# Patient Record
Sex: FEMALE | Race: BLACK OR AFRICAN AMERICAN | Employment: FULL TIME | URBAN - METROPOLITAN AREA
[De-identification: names, ages, dates, MRNs, and addresses within clinical notes are randomized per-mention and may not be internally consistent; named-entity substitution may affect disease eponyms.]

---

## 2024-03-13 ENCOUNTER — OFFICE VISIT (OUTPATIENT)
Dept: INTERNAL MEDICINE CLINIC | Age: 49
End: 2024-03-13
Payer: COMMERCIAL

## 2024-03-13 VITALS
BODY MASS INDEX: 29.25 KG/M2 | DIASTOLIC BLOOD PRESSURE: 68 MMHG | HEART RATE: 68 BPM | HEIGHT: 68 IN | SYSTOLIC BLOOD PRESSURE: 112 MMHG | WEIGHT: 193 LBS

## 2024-03-13 DIAGNOSIS — E66.3 OVERWEIGHT: ICD-10-CM

## 2024-03-13 DIAGNOSIS — Z12.11 COLON CANCER SCREENING: ICD-10-CM

## 2024-03-13 DIAGNOSIS — Z00.00 ANNUAL PHYSICAL EXAM: Primary | ICD-10-CM

## 2024-03-13 DIAGNOSIS — Z23 NEED FOR TDAP VACCINATION: ICD-10-CM

## 2024-03-13 DIAGNOSIS — D21.9 LEIOMYOMA: ICD-10-CM

## 2024-03-13 PROBLEM — N60.09 BREAST CYST: Status: ACTIVE | Noted: 2024-01-11

## 2024-03-13 PROBLEM — A60.00 GENITAL HERPES: Status: ACTIVE | Noted: 2024-01-11

## 2024-03-13 PROBLEM — N94.6 DYSMENORRHEA: Status: ACTIVE | Noted: 2024-01-11

## 2024-03-13 PROCEDURE — 99386 PREV VISIT NEW AGE 40-64: CPT | Performed by: FAMILY MEDICINE

## 2024-03-13 SDOH — ECONOMIC STABILITY: FOOD INSECURITY: WITHIN THE PAST 12 MONTHS, YOU WORRIED THAT YOUR FOOD WOULD RUN OUT BEFORE YOU GOT MONEY TO BUY MORE.: NEVER TRUE

## 2024-03-13 SDOH — ECONOMIC STABILITY: FOOD INSECURITY: WITHIN THE PAST 12 MONTHS, THE FOOD YOU BOUGHT JUST DIDN'T LAST AND YOU DIDN'T HAVE MONEY TO GET MORE.: NEVER TRUE

## 2024-03-13 SDOH — ECONOMIC STABILITY: HOUSING INSECURITY
IN THE LAST 12 MONTHS, WAS THERE A TIME WHEN YOU DID NOT HAVE A STEADY PLACE TO SLEEP OR SLEPT IN A SHELTER (INCLUDING NOW)?: NO

## 2024-03-13 SDOH — HEALTH STABILITY: PHYSICAL HEALTH: ON AVERAGE, HOW MANY DAYS PER WEEK DO YOU ENGAGE IN MODERATE TO STRENUOUS EXERCISE (LIKE A BRISK WALK)?: 4 DAYS

## 2024-03-13 SDOH — HEALTH STABILITY: PHYSICAL HEALTH: ON AVERAGE, HOW MANY MINUTES DO YOU ENGAGE IN EXERCISE AT THIS LEVEL?: 40 MIN

## 2024-03-13 SDOH — ECONOMIC STABILITY: INCOME INSECURITY: HOW HARD IS IT FOR YOU TO PAY FOR THE VERY BASICS LIKE FOOD, HOUSING, MEDICAL CARE, AND HEATING?: NOT HARD AT ALL

## 2024-03-13 ASSESSMENT — PATIENT HEALTH QUESTIONNAIRE - PHQ9
SUM OF ALL RESPONSES TO PHQ QUESTIONS 1-9: 0
SUM OF ALL RESPONSES TO PHQ QUESTIONS 1-9: 0
2. FEELING DOWN, DEPRESSED OR HOPELESS: 0
SUM OF ALL RESPONSES TO PHQ9 QUESTIONS 1 & 2: 0
1. LITTLE INTEREST OR PLEASURE IN DOING THINGS: 0
SUM OF ALL RESPONSES TO PHQ QUESTIONS 1-9: 0
SUM OF ALL RESPONSES TO PHQ QUESTIONS 1-9: 0

## 2024-03-13 NOTE — PROGRESS NOTES
Td or Tdap) 10/16/2017    Colorectal Cancer Screen  Never done    Flu vaccine (1) 08/01/2023    COVID-19 Vaccine (3 - 2023-24 season) 09/01/2023    Lipids  11/02/2023         Plan:    See orders and patient instructions.  Age-appropriate preventative issues discussed and hand out given.    Follow up:  Return in about 1 year (around 3/13/2025).

## 2024-03-13 NOTE — PATIENT INSTRUCTIONS
ON LINE WEIGHT LOSS AND/OR NUTRITION COUNSELING    Best Overall: [facility]  Best Budget: well  Best for Medical Nutrition Therapy: [facility]  Best Template-Based: [employer]    Nutrition is a daunting topic for many: Overhauling lifelong eating habits isn’t easy. While improving your nutrition is arguably one of the most difficult challenges you will ever undertake, it’s more than worth it.    Because nutrition can be so tough to tackle, though, it might be worth investing in nutrition counseling of some sort. Traditional, in-person nutrition counseling can be cumbersome and cost-prohibitive for many people--which is where online nutrition counseling comes in.    Online nutrition counseling works much like traditional nutrition counseling, but it’s often offered at a lower price point and takes up less time because you don’t have to commute anywhere. If you’re ready to revamp your eating habits, start by learning about the best online nutrition counseling programs.    You can check with your insurance about coverage.    Most Dieticians charge between $100 and $300 per hour.      Avoid foods that keep estrogen high.  Having too much body fat keeps the estrogen high and dairy products usually do also.       You can call or My Chart to make sure I have enough staff to have walk in tetanus booster.    xxxxxxxxxxxx[id number]    Basic Weight Loss suggestions.  +No quick diets:  losing weight is a marathon and not a sprint.  +Consider eating clean or healthy 5-6 days per week and splurge 1-2 days a week.    +Minimum servings 3 veggies and 2 fruits per day.  Try to eat a rainbow of colors over several weeks.  +See if you can add in foods high in polyphenols --- like blackberries, blueberries, pomegranate, red and black currants, grape seed extract.  And also things high in MCT = medium chain triglycerides like coconut oil, MCT oil, goat cheese.  +Limit or avoid added sugars.  Limit